# Patient Record
Sex: MALE | URBAN - METROPOLITAN AREA
[De-identification: names, ages, dates, MRNs, and addresses within clinical notes are randomized per-mention and may not be internally consistent; named-entity substitution may affect disease eponyms.]

---

## 2018-03-29 ENCOUNTER — OFFICE VISIT - RIVER FALLS (OUTPATIENT)
Dept: FAMILY MEDICINE | Facility: CLINIC | Age: 73
End: 2018-03-29

## 2018-03-29 ASSESSMENT — MIFFLIN-ST. JEOR: SCORE: 1591.31

## 2018-04-03 ENCOUNTER — OFFICE VISIT - RIVER FALLS (OUTPATIENT)
Dept: FAMILY MEDICINE | Facility: CLINIC | Age: 73
End: 2018-04-03

## 2018-04-04 LAB
CREAT SERPL-MCNC: 0.93 MG/DL (ref 0.7–1.18)
GLUCOSE BLD-MCNC: 90 MG/DL (ref 65–99)

## 2018-04-05 ENCOUNTER — OFFICE VISIT - RIVER FALLS (OUTPATIENT)
Dept: FAMILY MEDICINE | Facility: CLINIC | Age: 73
End: 2018-04-05

## 2018-04-17 ENCOUNTER — OFFICE VISIT - RIVER FALLS (OUTPATIENT)
Dept: FAMILY MEDICINE | Facility: CLINIC | Age: 73
End: 2018-04-17

## 2018-04-17 ENCOUNTER — COMMUNICATION - RIVER FALLS (OUTPATIENT)
Dept: FAMILY MEDICINE | Facility: CLINIC | Age: 73
End: 2018-04-17

## 2018-04-17 LAB
CREAT SERPL-MCNC: 0.98 MG/DL (ref 0.72–1.25)
GLUCOSE BLD-MCNC: 153 MG/DL (ref 65–100)

## 2018-05-09 ENCOUNTER — OFFICE VISIT - RIVER FALLS (OUTPATIENT)
Dept: FAMILY MEDICINE | Facility: CLINIC | Age: 73
End: 2018-05-09

## 2018-07-05 ENCOUNTER — OFFICE VISIT - RIVER FALLS (OUTPATIENT)
Dept: FAMILY MEDICINE | Facility: CLINIC | Age: 73
End: 2018-07-05

## 2020-08-21 ENCOUNTER — OFFICE VISIT - RIVER FALLS (OUTPATIENT)
Dept: FAMILY MEDICINE | Facility: CLINIC | Age: 75
End: 2020-08-21

## 2021-01-01 ENCOUNTER — AMBULATORY - RIVER FALLS (OUTPATIENT)
Dept: FAMILY MEDICINE | Facility: CLINIC | Age: 76
End: 2021-01-01

## 2021-03-31 ENCOUNTER — AMBULATORY - RIVER FALLS (OUTPATIENT)
Dept: FAMILY MEDICINE | Facility: CLINIC | Age: 76
End: 2021-03-31

## 2022-01-01 ENCOUNTER — COMMUNICATION - RIVER FALLS (OUTPATIENT)
Dept: FAMILY MEDICINE | Facility: CLINIC | Age: 77
End: 2022-01-01

## 2022-01-01 VITALS
WEIGHT: 189.8 LBS | WEIGHT: 176.4 LBS | BODY MASS INDEX: 26.58 KG/M2 | OXYGEN SATURATION: 97 % | SYSTOLIC BLOOD PRESSURE: 160 MMHG | SYSTOLIC BLOOD PRESSURE: 120 MMHG | SYSTOLIC BLOOD PRESSURE: 140 MMHG | SYSTOLIC BLOOD PRESSURE: 122 MMHG | DIASTOLIC BLOOD PRESSURE: 78 MMHG | DIASTOLIC BLOOD PRESSURE: 82 MMHG | HEART RATE: 44 BPM | HEART RATE: 64 BPM | TEMPERATURE: 97.4 F | TEMPERATURE: 97.6 F | DIASTOLIC BLOOD PRESSURE: 68 MMHG | TEMPERATURE: 98.2 F | DIASTOLIC BLOOD PRESSURE: 70 MMHG | BODY MASS INDEX: 26.05 KG/M2 | DIASTOLIC BLOOD PRESSURE: 78 MMHG | BODY MASS INDEX: 28.11 KG/M2 | TEMPERATURE: 97.7 F | HEIGHT: 69 IN | HEART RATE: 80 BPM | SYSTOLIC BLOOD PRESSURE: 142 MMHG | WEIGHT: 188.8 LBS | TEMPERATURE: 97 F | BODY MASS INDEX: 27.88 KG/M2 | HEART RATE: 60 BPM | WEIGHT: 180 LBS | HEART RATE: 72 BPM

## 2022-01-01 VITALS
TEMPERATURE: 98.1 F | WEIGHT: 180.6 LBS | HEART RATE: 83 BPM | SYSTOLIC BLOOD PRESSURE: 130 MMHG | OXYGEN SATURATION: 98 % | DIASTOLIC BLOOD PRESSURE: 82 MMHG

## 2022-01-01 VITALS
TEMPERATURE: 97.6 F | SYSTOLIC BLOOD PRESSURE: 122 MMHG | BODY MASS INDEX: 24.13 KG/M2 | HEART RATE: 80 BPM | DIASTOLIC BLOOD PRESSURE: 64 MMHG | WEIGHT: 163.4 LBS

## 2022-02-16 NOTE — PROGRESS NOTES
Chief Complaint    Follow up mood and BMP results. Worsening depression and decreased appetite. According to  and friend, patient is having suicidal thoughts.  History of Present Illness      pt here today with 2 family friends and wife, pt has been taking lexapro and quetiapine but continues to have suicidal ideation.  he has been a poor historian due to his medical condition, but today his friend notes that pt's depressed mood was of sudden onset about 3-4 months ago.  it has been resistant to treatment in an outpt setting, and he is willing to seek in patient care, although would prefer to not need it.  His wife reports he paces all night, he seems very anxious.  His PHQ9 = 25/27.  Physical Exam   Vitals & Measurements    T: 98.2   F (Tympanic)  HR: 44(Peripheral)  BP: 160/70  SpO2: 97%     HT: 69 in  WT: 180 lb       Review of systems is negative except as per HPI      Exam:      General: alert and oriented ×3 no acute distress.      HEENT: pupils are equal round and reactive to light extraocular motion is intact. Normocephalic and atraumatic.       Hearing is grossly normal and there is no otorrhea.       Nares are patent there is no rhinorrhea.       Mucous membranes are moist and pink.      Chest: has bilateral rise with no increased work of breathing.      Cardiovascular: normal perfusion and brisk capillary refill.      Musculoskeletal: no gross focal abnormalities and normal gait.      Neuro: no gross focal abnormalities and memory seems intact.      Psychiatric: speech is clear and coherent , memory seems impaired, pt not wearing a shirt and looks disheveled, affect is flat,         Assessment/Plan       Adjustment disorder (F43.0)         Orders:          16902 office outpatient visit 15 minutes (Charge), Quantity: 1, Adjustment disorder  Suicidal ideation          21783 office outpatient visit 15 minutes (Charge), Quantity: 1, Adjustment disorder          Return to Clinic (Request), RFV: follow  up mood, Return in 3 weeks          Return to Clinic (Request), RFV: lab for stat BMP and then appt with me to follow, thanks, Return in 1 week          Return to Clinic (Request), RFV: needs stat BMP for hyponatremia then appt with Tootie to follow, thanks, Return in 1 week                Suicidal ideation (R45.851)         Orders:          67158 office outpatient visit 15 minutes (Charge), Quantity: 1, Adjustment disorder  Suicidal ideation               15 minutes spent with patient in direct face to face contact, > 50% of time spent counseling and coordinating care.   spoke with [facility] ER physician for warm hand off,  pt will be arriving by POV with his wife and 2 family friends  Patient Information     Name:DOUG SANCHEZ      Address:      [address] [address]     Sex:Male     YOB: 1945     [phone]     Emergency Contact:KIAH SANCHEZ     MRN:[id number]     FIN:[id number]     Location:[facility]     Date of Service:05/09/2018      Primary Care Physician:       NONE ,       Attending Physician:       Alize Sommers MDica, [phone]  Problem List/Past Medical History    Ongoing     Actinic keratosis     Adjustment disorder     Anticoagulated     Benign prostatic hyperplasia       Comments: Elevated PSA     Changing skin lesion       Comments: Left upper cheek.     Facial basal cell cancer     Hx of adenomatous colonic polyps     Hydrocele     Hypertension     Low back pain     Neck pain     Osteoarthritis       Comments: Bilateral knees     Ptosis of both eyelids     Sacroiliac pain    Historical     No qualifying data  Procedure/Surgical History     Colonoscopy (01/13/2016)             Comments: Two tiny rectosigmoid polyps removed. Normal. &nbsp;Repeat in 5 years' time.     Replacement of left knee joint (01/21/2014)           Replacement of right knee joint (2010)           Hernia (1997)             Comments: Right  side     Excision of papilloma (11/07/1991)             Comments: Left hypopharyngeal wall.     Hernia (1962)             Comments: Left side     Shoulder             Comments: right     Varicocele             Comments: Right testicle. &nbsp;1980s.  Medications     hydrochlorothiazide-lisinopril 12.5 mg-10 mg oral tablet: 1 tab(s), PO, Daily, 30 tab(s), 0 Refill(s).     SEROquel 25 mg oral tablet: 25 mg, 1 tab(s), po, qhs, 30 tab(s), 1 Refill(s).     sodium chloride 1 g oral tablet: See Instructions, 1/2 tablet po qday, 30 EA, 1 Refill(s).     Lexapro 20 mg oral tablet: See Instructions, 1/2 tab(s) PO Daily, 15 EA, 1 Refill(s).          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 Exercise type: Bicycling, Walking, Weight lifting., 04/05/2018     Home and Environment      Marital status: ., 04/05/2018     Nutrition and Health      Type of diet: Regular., 04/05/2018     Sexual      Sexually active: Yes. Sexual orientation: Straight or heterosexual., 04/05/2018     Substance Abuse      Never, 04/05/2018     Tobacco      Never (less than 100 in lifetime), 04/05/2018  Family History    Patient was adopted    Son: History is negative  Immunizations      Vaccine Date Status      influenza virus vaccine, inactivated 11/06/2017 Recorded      influenza virus vaccine, inactivated 10/25/2016 Recorded      influenza virus vaccine, inactivated 10/14/2015 Recorded      influenza virus vaccine, inactivated 10/03/2014 Recorded      tetanus/diphth/pertuss (Tdap) adult/adol 01/22/2010 Recorded      influenza virus vaccine, inactivated 11/05/2008 Recorded      Td 02/02/1999 Recorded  Lab Results       Lab Results (Last 4 results within 90 days)        Sodium Level:  135 [135 mmol/L - 145 mmol/L] (04/17/18 15:23:00 CDT)       Sodium Level: 127 mmol/L Low [135 mmol/L - 146 mmol/L] (04/03/18 14:34:00 CDT)       Potassium Level: 3.9 [3.5 mmol/L - 5 mmol/L] (04/17/18 15:23:00 CDT)       Potassium Level: 4.1 mmol/L [3.5 mmol/L - 5.3 mmol/L] (04/03/18 14:34:00 CDT)       Chloride Level: 98 [98 mmol/L - 110 mmol/L] (04/17/18 15:23:00 CDT)       Chloride Level: 91 mmol/L Low [98 mmol/L - 110 mmol/L] (04/03/18 14:34:00 CDT)       CO2 Level: 26 [21 mmol/L - 31 mmol/L] (04/17/18 15:23:00 CDT)       CO2 Level: 29 mmol/L [20 mmol/L - 31 mmol/L] (04/03/18 14:34:00 CDT)       AGAP: 11 [5  - 18] (04/17/18 15:23:00 CDT)       Glucose Level: 153 High [65 mg/dL - 100 mg/dL] (04/17/18 15:23:00 CDT)       Glucose Level: 90 mg/dL [65 mg/dL - 99 mg/dL] (04/03/18 14:34:00 CDT)       BUN: 21 [8 mg/dL - 25 mg/dL] (04/17/18 15:23:00 CDT)       BUN: 14 mg/dL [7 mg/dL - 25 mg/dL] (04/03/18 14:34:00 CDT)       Creatinine Level: 0.98 [0.72 mg/dL - 1.25 mg/dL] (04/17/18 15:23:00 CDT)       Creatinine Level: 0.93 mg/dL [0.7 mg/dL - 1.18 mg/dL] (04/03/18 14:34:00 CDT)       BUN/Creat Ratio: 21 High [10  - 20] (04/17/18 15:23:00 CDT)       BUN/Creat Ratio: NOT APPLICABLE [6  - 22] (04/03/18 14:34:00 CDT)       eGFR: 83 mL/min/1.73m2 [8.6 mg/dL - 10.3 mg/dL] (04/03/18 14:34:00 CDT)       eGFR : >60 [6  - 22] (04/17/18 15:23:00 CDT)       eGFR African American: 96 mL/min/1.73m2 [6  - 22] (04/03/18 14:34:00 CDT)       eGFR Non-: >60 [6  - 22] (04/17/18 15:23:00 CDT)       Calcium Level: 9.6 [8.5 mg/dL - 10.5 mg/dL] (04/17/18 15:23:00 CDT)       Calcium Level: 9.8 mg/dL [8.6 mg/dL - 10.3 mg/dL] (04/03/18 14:34:00 CDT)       T4 Free: 1.3 ng/dL [0.8 ng/dL - 1.8 ng/dL] (03/29/18 12:25:00 CDT)       TSH: 1.6 mIU/L [0.4 mIU/L - 4.5 mIU/L] (03/29/18 12:25:00 CDT)

## 2022-02-16 NOTE — TELEPHONE ENCOUNTER
Message left for patient on 8-24-20 and no return call.  Tried to leave another message, but machine was not working correctly.

## 2022-02-16 NOTE — NURSING NOTE
Comprehensive Intake Entered On:  8/21/2020 1:06 PM CDT    Performed On:  8/21/2020 1:03 PM CDT by Rere Jenkins CMA   Chief Complaint :   f/u hospital stay 7/28-8/1.    Weight Measured :   180.6 lb(Converted to: 180 lb 10 oz, 81.92 kg)    Systolic Blood Pressure :   130 mmHg   Diastolic Blood Pressure :   82 mmHg (HI)    Mean Arterial Pressure :   98 mmHg   Peripheral Pulse Rate :   83 bpm   BP Site :   Right arm   BP Method :   Manual   HR Method :   Electronic   Temperature Tympanic :   98.1 DegF(Converted to: 36.7 DegC)    Oxygen Saturation :   98 %   Rere Jenkins CMA - 8/21/2020 1:03 PM CDT   Health Status   Allergies Verified? :   Yes   Medication History Verified? :   Yes   Pre-Visit Planning Status :   N/A   Tobacco Use? :   Never smoker   Rere Jenkins CMA - 8/21/2020 1:03 PM CDT   Consents   Consent for Immunization Exchange :   Consent Granted   Consent for Immunizations to Providers :   Consent Granted   Rere Jenkins CMA - 8/21/2020 1:03 PM CDT   Meds / Allergies   (As Of: 8/21/2020 1:06:56 PM CDT)   Allergies (Active)   No Known Medication Allergies  Estimated Onset Date:   Unspecified ; Created By:   Alesha Morfin CMA; Reaction Status:   Active ; Category:   Drug ; Substance:   No Known Medication Allergies ; Type:   Allergy ; Updated By:   Alesha Morfin CMA; Reviewed Date:   7/5/2018 12:56 PM CDT        Medication List   (As Of: 8/21/2020 1:06:56 PM CDT)   Prescription/Discharge Order    amLODIPine  :   amLODIPine ; Status:   Completed ; Ordered As Mnemonic:   amLODIPine 10 mg oral tablet ; Simple Display Line:   10 mg, 1 tab(s), PO, Daily, 90 tab(s), 3 Refill(s) ; Ordering Provider:   Asha Sommers MD; Catalog Code:   amLODIPine ; Order Dt/Tm:   7/5/2018 1:47:07 PM CDT            Home Meds    apixaban  :   apixaban ; Status:   Documented ; Ordered As Mnemonic:   apixaban 5 mg oral tablet ; Simple Display Line:   5 mg, Oral, bid, 60 tab(s), 0 Refill(s) ; Catalog Code:    apixaban ; Order Dt/Tm:   8/21/2020 1:05:20 PM CDT          levETIRAcetam  :   levETIRAcetam ; Status:   Documented ; Ordered As Mnemonic:   levETIRAcetam 500 mg oral tablet ; Simple Display Line:   500 mg, 1 tab(s), Oral, bid, 0 Refill(s) ; Catalog Code:   levETIRAcetam ; Order Dt/Tm:   8/21/2020 1:05:42 PM CDT          mirtazapine  :   mirtazapine ; Status:   Documented ; Ordered As Mnemonic:   mirtazapine 15 mg oral tablet ; Simple Display Line:   22.5 mg, 1.5 tab(s), PO, Once a day (at bedtime), 30 tab(s), 0 Refill(s) ; Catalog Code:   mirtazapine ; Order Dt/Tm:   7/5/2018 12:57:44 PM CDT          senna  :   senna ; Status:   Documented ; Ordered As Mnemonic:   senna 8.6 mg oral tablet ; Simple Display Line:   25.8 mg, 3 tab(s), PO, bid, PRN: for constipation, 20 tab(s), 0 Refill(s) ; Catalog Code:   senna ; Order Dt/Tm:   7/5/2018 12:58:04 PM CDT          venlafaxine  :   venlafaxine ; Status:   Documented ; Ordered As Mnemonic:   venlafaxine 75 mg oral capsule, extended release ; Simple Display Line:   225 mg, 3 cap(s), PO, Daily, 30 cap(s), 0 Refill(s) ; Catalog Code:   venlafaxine ; Order Dt/Tm:   7/5/2018 12:58:32 PM CDT            ID Risk Screen   Recent Travel History :   No recent travel   Family Member Travel History :   No recent travel   Other Exposure to Infectious Disease :   Unknown   Rere Jenkins CMA - 8/21/2020 1:03 PM CDT

## 2022-02-16 NOTE — TELEPHONE ENCOUNTER
---------------------  From: Sheeba Olga HARKINS   Sent: 2/18/2021 5:04:42 PM CST  Subject: General Message-Keppra med/refill     Phone Message:      PCP: BALAJI    Person Calling: Son(no name left)  Phone: [phone]  Time: 4:18pm    Reason for call: Son called and left a message stating that pt has seen Dr. Sommers and just noticed that pt is totally out of his Keppra medication and needs it filled. I called to get more information and wife answered stating that pt does not leave their house much anymore and son got worried when he noticed medication was gone and wife states pt doesn't like taking his medications but she is not sure what provider at [facility] fills the Keppra medication. Informed pt that since Dr. Sommers hasn't seen him since 8/2020 and never has filled his Keppra or any meds and not in clinic today, that either looking at the pill bottle to get the provider information or contact [facility] pharmacy and have them assist her with contacting prescribing provider for refill. Wife agrees and will call us back if she needs anything.             Note:    Transferred to:

## 2022-02-16 NOTE — PROGRESS NOTES
Chief Complaint    Patient is here for major depression and anxiety. Also c/o vertebrae in neck and related pain. Has not thyroid checked in 3-4 years. Has also had some high er blood pressure in the mornings.  History of Present Illness      Patient is here today to establish care with me.  He reports that he has been having a depressed mood for about 3-3-1/2 months.  He has lost 2 children in the past 6 months, one due to illness and the other 2 an accident.  He has one living child.  His PHQ 9 is equal to 12 and his RAS 7 is equal to 15.  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  His primary care clinic has been with the [facility] however he would like to switch to [facility] because he is only a couple of miles away from our clinic and her  which still measures about 45 minutes away for him.       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  He is followed by [facility] [place] .  He  is in to see them approximately every 6 months.       He also has had intermittent neck pain for approximately 5 years.  He reports that the chiropractor told him that it was kind of mushy at 1 of his discs.  The last time it flared was about a week ago.  He is interested in getting an x-ray done on this.       Past surgical history report of knee replacement ×2, shoulder surgery ×1.       Social history patient is , he is a retired clinical psychologist.  He is one dislocation away from having his PhD.  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  He does not drink smoke or do any illicit drugs of abuse.  He does exercise regularly, usually lifting weights approximately times per week and walks or rides a bike 4-5 times per week.       Family history patient is adopted so he does not know his personal family history but does know  that he was Sarah.       Review of systems is positive for some weight loss.  At first he thinks that this was intentional but now thinks that there may also be an element of a mood disorder associated with this.  He also has some vision changes.  He reports that he has a right eye cataract that is followed by Dr. Ortiz.       Exam       General patient is alert and oriented ×3 in no acute distress.  He is normocephalic and atraumatic his pupils are equally round and reactive to light and his extraocular motion is intact his conjunctiva is not injected his hearing is grossly normal.  His chest has bilateral rise with no increased work of breathing.  Cardiovascular normal tissue perfusion and brisk capillary refill.  Musculoskeletal walks independently.  Neuro cranial nerves II through XII are grossly intact and he has a resting hand tremor.  Psych patient makes good eye contact.  He has good insight.  His memory is intact.  His thoughts are intact.  He has no suicidal or homicidal ideation.  His mood is both sad and anxious.  His affect is sad.  He has some slightly pressured speech as well as some psychomotor agitation.       Skin patient has numerous actinic keratoses on his back chest face.  He has a well-healing incision on his left shoulder.  No evidence of infection.       Assessment and plan       #1 adjustment disorder most likely related to the death of 2 of his 3 children within the past 6 months and then also some personal problems that his son is dealing with.  Encouraged patient to check with his insurance to find a psychologist that he would feel comfortable talking with that would be in network.  Patient would also like to have his thyroid checked which I am in agreement with.  Orders for this was placed today.  Patient also would like to start with medications.  When he was in practice he found that several of the doctors that he had worked with use the combination of fluoxetine 40 mg daily and  clonazepam 1 mg as needed.  We discussed that fluoxetine can sometimes cause some initial worsening of anxiety symptoms so felt that Lexapro might be a better initial choice for SSRI.  We discussed that for most adults he can start with 10 mg daily and that this is usually at therapeutic dose and that frequently people are feeling improved within a week.  In regards to the clonazepam I am concerned about risk of sedation and falls.  Would prefer to wait on the thyroid testing to see how patient does with the Lexapro.  Will have patient return to clinic in 1-2 weeks.  For cost reasons we did decide to go with the Lexapro 20 mg one half tablet p.o. daily       #2 patient with numerous actinic keratoses and history of numerous skin cancers.  Patient continues to follow-up with [facility] in [facility].       45 minutes was spent with patient today in direct face-to-face contact of which greater than 50% of the time spent counseling patient.  Physical Exam   Vitals & Measurements    T: 97.4(Tympanic)  HR: 64(Peripheral)  BP: 142/82     HT: 69.00 in  WT: 189.8 lb  BMI: 28.03   Assessment/Plan       Depressed         Ordered:          escitalopram, See Instructions, Instructions: 1/2 tab(s) PO Daily, # 30 EA, 1 Refill(s), Type: Hard Stop          escitalopram, See Instructions, Instructions: 1/2 tab(s) PO Daily, # 30 EA, 1 Refill(s), Type: Maintenance, Pharmacy: [id number], 1/2 tab(s) PO Daily          T4, free* ([facility]), Specimen Type: Serum, Collection Date: 03/29/18 11:52:00 CDT          TSH* ([facility]), Specimen Type: Serum, Collection Date: 03/29/18 11:52:00 CDT                Neck pain         Ordered:          XR Spine Cervical Comp w/ Obliques (Request), Neck pain                Orders:         Return to Clinic (Request), Return in 1 week  Patient Information     Name:DOUG SANCHEZ      Address:      [address] [address]     Sex:Male     YOB: 1945      [phone]     Emergency Contact:KIAH SANCHEZ     MRN:[id number]     FIN:[id number]     Location:[facility]     Date of Service:03/29/2018      Primary Care Physician:       NONE ,   Problem List/Past Medical History    Ongoing     Anticoagulated     Knee Replacement    Historical  Medications        hydrochlorothiazide-lisinopril 12.5 mg-10 mg oral tablet: 1 tab(s), PO, Daily, 30 tab(s), 0 Refill(s).        cyclobenzaprine 10 mg oral tablet: 10 mg, 1 tab(s), PO, TID, PRN: for spasm, 30 tab(s), 0 Refill(s).        Lexapro 20 mg oral tablet: See Instructions, 1/2 tab(s) PO Daily, 30 EA, 1 Refill(s).                Allergies    No known allergies  Social History    Smoking Status - 03/29/2018     Never smoker  Immunizations      Vaccine Date Status      influenza virus vaccine, inactivated 11/05/2008 Recorded  Lab Results      Results (Last 90 days)      No results located.

## 2022-02-16 NOTE — LETTER
(Inserted Image. Unable to display)       November 20, 2019        DOUG SANCHEZ   [address], [place] [id number]      Dear DOUG,      Thank you for selecting [facility] for your healthcare needs.      Hi Doug,    I have not seen you in  awhile.  Please come in for a follow up appointment.  I'm worried about you and your depression.          Please contact my practice at [phone] if you have any questions or concerns.     Sincerely,        Asha Sommers MD

## 2022-02-16 NOTE — PROGRESS NOTES
Chief Complaint    Patient is here for anxiety and depression follow up. Concerned about serotonin syndrome.  History of Present Illness      Patient presents to clinic today for follow-up of his adjustment disorder.  At our last visit he was started on Lexapro 10 mg daily.  He reports that he thinks that he is feeling more anxious.  However, he thinks that this is most likely situational.  He living son is having some personal problems.  Patient's concern regarding his son is affecting his mood.  It is also causing him to remember the feelings regarding the death of his 2 other children.  His relationship with his wife is always been good however the stress due to the concern regarding her living son is creating some problems with her relationship.  Not because they are finding but rather because they are both so concerned about him.  He seems to be ruminating over thoughts of behaviors that she is regretful for.  He also said that he could not remember if I had recommended checking both the sodium and potassium level.  He thinks that despite his feeling that he might be a little more anxious than before she would like to continue with the Lexapro for an entire month.  He would, however, like to have a as needed benzyl available.  He is particularly familiar with use of Klonopin due to his previous clinical experiences.  I explained that due to his age I would prefer to try something that was shorter acting.  I would like us to be able to get his mood treated well enough that he did not feel he needed to use the anxiety medicine very frequently.  I would not want him to drive while taking it.  We also discussed counseling again.  He reports that he had not looked into finding counselor because he feels that he has a strong support system of the past years and other mental health professionals that he had worked with better now spasms.  However, when he is with them they will ask him how he is doing things himself  being exhausted by having to reassure them that he is doing okay.  We discussed that he see him that he might find it to be more beneficial to actually see a professional that he does not have to feel she needs to reassure her.  Encouraged him again to consider referral to counseling.      He would like to try some ativan, said he thought that he might use is 3-4 days per week  My goal for him would be to use this only while waiting for the lexapro to become effective.  He is aware that it can interact with his cyclobenzaprine so he should stop taking the cyclobenzaprine.      also discussedd neck imaging, he gets neck pain exacerbations about 4 times per year, xr shows some degenerative changes.  recommended he let me know the next time he has an exacerbation so we can try some oral prednisone for him.  also discussed foraminal stenosis      ros as per HPI otherwise neg      examgen a/ox3nad      heent perrl eomi      chest bilateral tise no increased wob      cv nml perfusion and brisk cap refill      neuro CN 2-12 GI, mild memory impairment      psych less disheveled than last week, seems less sad, some psychomotor agitation, no SI or HI, pressured speech      a/p      adjustment disorder cont with the lexapro, will add some prn ativan but pt told to dc his cyclobenzaprin and not drive with the ativan, consider again finding a counselor      chronic intermittant neck pain suggested pt rtc prn and may try steroid burst with next exacerbation      25 minutes spent with pt > 50 % spent counseling.  Physical Exam   Vitals & Measurements    T: 97.0(Tympanic)  HR: 80(Peripheral)  BP: 122/78     WT: 188.8 lb   Assessment/Plan       Adjustment disorder         Ordered:          LORazepam, 1 tab(s) ( 0.5 mg ), po, daily, PRN: as needed for anxiety, # 20 tab(s), 0 Refill(s), Type: Maintenance, Pharmacy: [id number], 1 tab(s) po daily,PRN:as needed for anxiety          Basic Metabolic Panel* ([facility]), Specimen  Type: Serum, Collection Date: 04/03/18 14:22:00 CDT           Patient Information     Name:DOUG SANCHEZ      Address:      [address] [address]     Sex:Male     YOB: 1945     [phone]     Emergency Contact:KIAH SANCHEZ     MRN:[id number]     FIN:[id number]     Location:[facility]     Date of Service:04/03/2018      Primary Care Physician:       NONE ,   Problem List/Past Medical History    Ongoing     Anticoagulated     Knee Replacement    Historical  Medications        hydrochlorothiazide-lisinopril 12.5 mg-10 mg oral tablet: 1 tab(s), PO, Daily, 30 tab(s), 0 Refill(s).        cyclobenzaprine 10 mg oral tablet: 10 mg, 1 tab(s), PO, TID, PRN: for spasm, 30 tab(s), 0 Refill(s).        Lexapro 20 mg oral tablet: See Instructions, 1/2 tab(s) PO Daily, 30 EA, 1 Refill(s).        LORazepam 0.5 mg oral tablet: 0.5 mg, 1 tab(s), po, daily, PRN: as needed for anxiety, 20 tab(s), 0 Refill(s).  Allergies    No known allergies  Social History    Smoking Status - 04/03/2018     Never smoker  Immunizations      Vaccine Date Status      influenza virus vaccine, inactivated 11/06/2017 Recorded      influenza virus vaccine, inactivated 10/25/2016 Recorded      influenza virus vaccine, inactivated 10/14/2015 Recorded      influenza virus vaccine, inactivated 10/03/2014 Recorded      tetanus/diphth/pertuss (Tdap) adult/adol 01/22/2010 Recorded      influenza virus vaccine, inactivated 11/05/2008 Recorded  Lab Results      Results (Last 90 days)                Laboratory                     Chemistry                          General Chemistry                               BUN:      14 mg/dL  (04/03/18 02:34 PM CDT)                                                                                                                                          BUN/Creat Ratio:      NOT APPLICABLE   (04/03/18 02:34 PM CDT)                                                                                                                                           Basic Metabolic Profile:         (04/03/18 02:34 PM CDT)                                                                                                                                          CO2 Level:      29 mmol/L  (04/03/18 02:34 PM CDT)                                                                                                                                          Calcium Level:      9.8 mg/dL  (04/03/18 02:34 PM CDT)                                                                                                                                          Chloride Level:      L 91 mmol/L (Range 98 - 110)  (04/03/18 02:34 PM CDT)                                                                                                                                          Creatinine Level:      0.93 mg/dL  (04/03/18 02:34 PM CDT)                                                                                                                                          Glucose Level:      90 mg/dL  (04/03/18 02:34 PM CDT)                                                                                                                                          Potassium Level:      4.1 mmol/L  (04/03/18 02:34 PM CDT)                                                                                                                                          Sodium Level:      L 127 mmol/L (Range 135 - 146)  (04/03/18 02:34 PM CDT)                                                                                                                                          eGFR:      83 mL/min/1.73m2  (04/03/18 02:34 PM CDT)                                                                                                                                          eGFR :      96 mL/min/1.73m2  (04/03/18 02:34 PM CDT)                                                                                                                                      Thyroid Studies                               T4 Free                                        (03/29/18 12:25 PM CDT)                                                                                                                                                1.3 ng/dL  (03/29/18 12:25 PM CDT)                                                                                                                                          TSH                                        (03/29/18 12:25 PM CDT)                                                                                                                                                1.60 mIU/L  (03/29/18 12:25 PM CDT)

## 2022-02-16 NOTE — LETTER
(Inserted Image. Unable to display)     September 21, 2020      DOUG SANCHEZ   [address] [place], [place] [id number]          Dear DOUG,      Thank you for selecting [facility] (previously [facility]) for your healthcare needs.      Our records indicate you are due for the following services:     Follow-up office visit.      To schedule an appointment or if you have further questions, please contact your primary clinic:   [facility]       [phone]   [facility]       [phone]              [facility]     [phone]      Powered by [employer]    Sincerely,    Asha Sommers MD

## 2022-02-16 NOTE — PROGRESS NOTES
Chief Complaint    Patient is here for follow up from [facility].  History of Present Illness      patient present to clinic today for follow up from the hospital.        He was hospitalized for his major depressive disorder with psychotic features that was resistant to numerous outpatient medications.  While hospitalized he was also noted to have a heart block and underwent pacemaker placement.  He has outpatient follow-up scheduled with psychiatry next week.  He was discharged start with Effexor but reports that he has not been taking it.  Patient's discharge summary was reviewed and shows that patient had good response to electroconvulsive therapy.  He underwent a total of 7 treatments.  He reports he does not think that the Effexor is helpful for him.  However his wife reports that she thinks that it makes a difference for him.  Counseled patient that anytime the medication is tapered up we usually taper it back down and I would really encourage him to talk with his psychiatrist before abruptly stopping his antidepressant medications as he is certainly doing much better now than it was prior to his hospitalization.  He voiced understanding and agrees with this.       He also reports that his ears have been feeling clogged and has had some decreased hearing but no pain or fevers nausea vomiting diarrhea or constipation.  He also has no chest pain or shortness of breath or wheezes or cough.       He also has a skin lesion on his left forearm that he is worried about.  He has a history of skin cancer and is usually followed by dermatology.  He is not sure how long it has been there but says that it is not healing.  He is not sure if it has changed in size.       Review of systems is negative except as per HPI including:  no fevers, chills, sore throat, runny nose, nausea, vomiting, constipation, diarrhea, rash or new skin lesions, chest pain, palpitations, slurred speech, new paresthesia, shortness of  breath or wheeze.       Exam:       General: alert and oriented ×3 no acute distress.       HEENT: pupils are equal round and reactive to light extraocular motion is intact. Normocephalic and atraumatic.        Hearing is grossly mildly impaired.  And there is no otorrhea.  He does have bilateral cerumen impaction.  I was able to use an ear curette to remove some of this wax.  CSS then finished the cerumen disimpaction with irrigation.  Overall he tolerated the procedure quite well.       Nares are patent there is no rhinorrhea.        Mucous membranes are moist and pink.       Chest: has bilateral rise with no increased work of breathing.       Cardiovascular: normal perfusion and brisk capillary refill.       Musculoskeletal: no gross focal abnormalities and normal gait.       Neuro: no gross focal abnormalities and memory seems intact.       Psychiatric: speech is clear and coherent and fluent. Patient dressed appropriately for the weather. Mood is appropriate and affect is full.       Skin patient has numerous actinic keratosis and seborrheic keratosis the lesion that is most concerned about at this time is a raised oval scaly tannish reddish lesion on his left forearm that is approximately 6 mm x 20 mm.  There is no eschar present.                        Discussed with patient to return to clinic if symptoms worsen or do not improve  Physical Exam   Vitals & Measurements    T: 97.6   F (Tympanic)  HR: 80(Peripheral)  BP: 122/64     WT: 163.4 lb   Assessment/Plan   1.  Major depressive disorder now much improved encouraged patient to keep appointment with his psychiatrist to continue with his Effexor until he is evaluated there.    #2 status post cardiac pacemaker placement due to heart block.  Patient should follow-up with his cardiologist as scheduled.    #3 bilateral cerumen impaction now status post disimpaction.  Encouraged patient once a weeks he is a drop of mineral oil or olive oil to each ear at bedtime  and to rinse that out in the shower in the morning to help prevent further impaction.    #4 nonhealing lesion on his left forearm.  A history of numerous previous skin cancers.  Explained to patient that I would be happy to remove the lesion for him but given his history of significant skin cancer burden and actinic keratosis would recommend that he instead follow-up with his dermatologist in [facility] so that they can reexamine both this lesion as well as the rest of his skin exam.  He agreed to follow-up with them soon.  Patient Information     Name:DOUG SANCHEZ      Address:      [address] [address]     Sex:Male     YOB: 1945     [phone]     Emergency Contact:KIAH SANCHEZ     MRN:[id number]     FIN:[id number]     Location:[facility]     Date of Service:07/05/2018      Primary Care Physician:       KATHARINE       Attending Physician:       Tootie HANKS [facility], [phone]  Problem List/Past Medical History    Ongoing     Actinic keratosis     Adjustment disorder     Anticoagulated     Benign prostatic hyperplasia       Comments: Elevated PSA     Changing skin lesion       Comments: Left upper cheek.     Facial basal cell cancer     Hx of adenomatous colonic polyps     Hydrocele     Hypertension     Low back pain     Neck pain     Osteoarthritis       Comments: Bilateral knees     Ptosis of both eyelids     Sacroiliac pain    Historical     No qualifying data  Procedure/Surgical History     Colonoscopy (01/13/2016)            Comments:      Two tiny rectosigmoid polyps removed.      Normal.  Repeat in 5 years' time.     Replacement of left knee joint (01/21/2014)           Replacement of right knee joint (2010)           Hernia (1997)            Comments:      Right side     Excision of papilloma (11/07/1991)            Comments:      Left hypopharyngeal wall.     Hernia (1962)            Comments:      Left side     Dual Chamber  Permanent Pacemaker Implant            Comments:      (Pocket Location): Left Pectoral  (Model: W1DR01; Serial Number: [id number])      (Chamber): Right Ventricle (Location): Septum (Model: 5076-58 ; Serial Number: [id number])      (Chamber): Right Atrium (Location): Right Appendage (Model: 5076-52 ; Serial Number: [id number])      : [employer]     Shoulder            Comments:      right     Varicocele            Comments:      Right testicle.  1980s.  Medications     mirtazapine 15 mg oral tablet: 22.5 mg, 1.5 tab(s), PO, Once a day (at bedtime), 30 tab(s), 0 Refill(s).     senna 8.6 mg oral tablet: 25.8 mg, 3 tab(s), PO, bid, PRN: for constipation, 20 tab(s), 0 Refill(s).     amLODIPine 10 mg oral tablet: 10 mg, 1 tab(s), PO, Daily, 90 tab(s), 0 Refill(s).     venlafaxine 75 mg oral capsule, extended release: 225 mg, 3 cap(s), PO, Daily, 30 cap(s), 0 Refill(s).          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 Exercise type: Bicycling, Walking, Weight lifting., 04/05/2018     Home and Environment      Marital status: ., 04/05/2018     Nutrition and Health      Type of diet: Regular., 04/05/2018     Sexual      Sexually active: Yes. Sexual orientation: Straight or heterosexual., 04/05/2018     Substance Abuse      Never, 04/05/2018     Tobacco      Never (less than 100 in lifetime), 04/05/2018  Family History    Patient was adopted    Son: History is negative  Immunizations      Vaccine Date Status      influenza virus vaccine, inactivated 11/06/2017 Recorded      influenza virus vaccine, inactivated 10/25/2016 Recorded      influenza virus vaccine, inactivated 10/14/2015 Recorded      influenza virus vaccine, inactivated  10/03/2014 Recorded      tetanus/diphth/pertuss (Tdap) adult/adol 01/22/2010 Recorded      influenza virus vaccine, inactivated 11/05/2008 Recorded      Td 02/02/1999 Recorded  Lab Results       Lab Results (Last 4 results within 90 days)        Sodium Level: 135 [135 mmol/L - 145 mmol/L] (04/17/18 15:23:00 CDT)       Potassium Level: 3.9 [3.5 mmol/L - 5 mmol/L] (04/17/18 15:23:00 CDT)       Chloride Level: 98 [98 mmol/L - 110 mmol/L] (04/17/18 15:23:00 CDT)       CO2 Level: 26 [21 mmol/L - 31 mmol/L] (04/17/18 15:23:00 CDT)       AGAP: 11 [5  - 18] (04/17/18 15:23:00 CDT)       Glucose Level: 153 High [65 mg/dL - 100 mg/dL] (04/17/18 15:23:00 CDT)       BUN: 21 [8 mg/dL - 25 mg/dL] (04/17/18 15:23:00 CDT)       Creatinine Level: 0.98 [0.72 mg/dL - 1.25 mg/dL] (04/17/18 15:23:00 CDT)       BUN/Creat Ratio: 21 High [10  - 20] (04/17/18 15:23:00 CDT)       eGFR : >60 [10  - 20] (04/17/18 15:23:00 CDT)       eGFR Non-: >60 [10  - 20] (04/17/18 15:23:00 CDT)       Calcium Level: 9.6 [8.5 mg/dL - 10.5 mg/dL] (04/17/18 15:23:00 CDT)

## 2022-02-16 NOTE — LETTER
(Inserted Image. Unable to display)         September 08, 2020        DOUG DANIEL   [address] [place], [place] [id number]        Dear DOUG,    Thank you for selecting [facility] for your healthcare needs.    Our records indicate you are due for the following services:    Follow-up office visit - Chest X-Ray    To schedule an appointment, please contact the referral department at [facility] who will assist you in scheduling your service. They can be reached at either of these numbers [phone] or [phone]. If you have other questions or need additional information, please feel free to contact your primary care clinic:     To schedule an appointment or if you have further questions, please contact your primary clinic:   [facility]       [phone]   [facility]       [phone]              [facility]     [phone]      Powered by [employer]    Sincerely,    Asha Sommers MD

## 2022-02-16 NOTE — PROGRESS NOTES
Chief Complaint    Patient is here for anxiety and depression. Was in ER this morning. Is requesting different medication.  History of Present Illness      I received a phone call from Dr. Deal in the emergency room today.  Patient's wife brought patient to the emergency room because all of his depression.  Dr. Deal know that I was in clinic and would be happy to see patient.  Patient was in clinic earlier this week to see me.  He had been on Lexapro for 1 week at that time and had not really improved at all.  We checked his sodium level and found that it was down to 127.  He was called yesterday and told to discontinue his Lexapro due to the low sodium levels.  Today patient has brought his wife, Sandi, with him.  She says that while they were at home patient was pacing could not relax was feeling very sad she was worried about him and he agreed with her that he should go to the emergency room.  Patient continues to report that he does feel very sad and that he would be better off dead but he has no desire to commit suicide due to his strong Christian jerald.  We agreed that if one SSRI it caused hyponatremia and then we would try to avoid this class of medications and see if alternate might be useful for him.  We discussed considering Zyprexa or quetiapine versus Depakote.  Today patient agreed with trying to start 12-1/2 mg of quetiapine daily.       Review of systems is as per HPI and otherwise negative       Exam general patient is a little disheveled is alert and oriented in no acute distress.       Psych patient has a hard time answering questions I do not know.  He makes eye contact.  He reports that he does feel depressed and anxious.  Denies suicidal or homicidal ideations no hallucinations.  His affect is flat.  He is more disheveled today when he was earlier in the week.       Assessment and plan major depressive disorder.  Concern for possible problems with dementia also.  Will try to start Seroquel 25  mg one half pill p.o. nightly and have patient return to clinic in 1 week.  He should return to clinic sooner if needed or again go to the emergency room if needed.  25 minutes was spent with patient and his wife in direct face-to-face contact of which greater than 50% time spent counseling patient.  Physical Exam   Vitals & Measurements    T: 97.6(Tympanic)  HR: 60(Peripheral)  BP: 140/78   Assessment/Plan       Adjustment disorder         Ordered:          QUEtiapine, See Instructions, Instructions: 1/2 tab(s) po qhs, # 30 EA, 1 Refill(s), Type: Maintenance, Pharmacy: [facility] #[id number], please discontinue prescription for seroquel 25 mg tid, sent in error! thanks, 1/2 tab(s) po qhs          [id number] office outpatient visit 25 minutes (Charge), Quantity: 1, Adjustment disorder          Referral (Request), 04/05/18 11:28:00 CDT, Referred to: Other, Referred to: geriatric psychiatry, Adjustment disorder          Return to Clinic (Request), RFV: follow up mood, Return in 3 weeks          Return to Clinic (Request), RFV: needs stat BMP for hyponatremia then appt with [facility] to follow, thanks, Return in 1 week                Orders:         LORazepam, 1 tab(s) ( 0.5 mg ), po, daily, PRN: as needed for anxiety, # 20 tab(s), 0 Refill(s), Type: Maintenance, Pharmacy: [facility] #[id number], 1 tab(s) po daily,PRN:as needed for anxiety         Basic Metabolic Panel* ([facility]), Specimen Type: Serum, Collection Date: 04/03/18 14:22:00 CDT  Patient Information     Name:DOUG SANCHEZ      Address:      [address] [address]     Sex:Male     YOB: 1945     [phone]     Emergency Contact:KIAH SANCHEZ     MRN:[id number]     FIN:[id number]     Location:[facility]     Date of Service:04/05/2018      Primary Care Physician:       NONE ,   Problem List/Past Medical History    Ongoing     Adjustment disorder     Anticoagulated     History of knee replacement      Neck pain    Historical  Procedure/Surgical History     Knee replacement     Shoulder  Medications        hydrochlorothiazide-lisinopril 12.5 mg-10 mg oral tablet: 1 tab(s), PO, Daily, 30 tab(s), 0 Refill(s).        LORazepam 0.5 mg oral tablet: 0.5 mg, 1 tab(s), po, daily, PRN: as needed for anxiety, 20 tab(s), 0 Refill(s).        SEROquel 25 mg oral tablet: See Instructions, 1/2 tab(s) po qhs, 30 EA, 1 Refill(s).                Allergies    No known allergies  Social History    Smoking Status - 04/05/2018     Never smoker     Alcohol - 04/05/2018      Never     Employment and Education - 04/05/2018      Retired, Work/School description: Psychologist.     Exercise and Physical Activity - 04/05/2018      Exercise frequency: 3-4 times/week. Exercise type: Bicycling, Walking, Weight lifting.     Home and Environment - 04/05/2018      Marital status: .     Nutrition and Health - 04/05/2018      Type of diet: Regular.     Sexual - 04/05/2018      Sexually active: Yes. Sexual orientation: Straight or heterosexual.     Substance Abuse - 04/05/2018      Never     Tobacco - 04/05/2018      Never (less than 100 in lifetime)  Immunizations      Vaccine Date Status      influenza virus vaccine, inactivated 11/06/2017 Recorded      influenza virus vaccine, inactivated 10/25/2016 Recorded      influenza virus vaccine, inactivated 10/14/2015 Recorded      influenza virus vaccine, inactivated 10/03/2014 Recorded      tetanus/diphth/pertuss (Tdap) adult/adol 01/22/2010 Recorded      influenza virus vaccine, inactivated 11/05/2008 Recorded  Lab Results      Results (Last 90 days)                Laboratory                     Chemistry                          General Chemistry                               BUN:      14 mg/dL  (04/03/18 02:34 PM CDT)                                                                                                                                          BUN/Creat Ratio:      NOT APPLICABLE    (04/03/18 02:34 PM CDT)                                                                                                                                          Basic Metabolic Profile:         (04/03/18 02:34 PM CDT)                                                                                                                                          CO2 Level:      29 mmol/L  (04/03/18 02:34 PM CDT)                                                                                                                                          Calcium Level:      9.8 mg/dL  (04/03/18 02:34 PM CDT)                                                                                                                                          Chloride Level:      L 91 mmol/L (Range 98 - 110)  (04/03/18 02:34 PM CDT)                                                                                                                                          Creatinine Level:      0.93 mg/dL  (04/03/18 02:34 PM CDT)                                                                                                                                          Glucose Level:      90 mg/dL  (04/03/18 02:34 PM CDT)                                                                                                                                          Potassium Level:      4.1 mmol/L  (04/03/18 02:34 PM CDT)                                                                                                                                          Sodium Level:      L 127 mmol/L (Range 135 - 146)  (04/03/18 02:34 PM CDT)                                                                                                                                          eGFR:      83 mL/min/1.73m2  (04/03/18 02:34 PM CDT)                                                                                                                                          eGFR   American:      96 mL/min/1.73m2  (04/03/18 02:34 PM CDT)                                                                                                                                     Thyroid Studies                               T4 Free                                        (03/29/18 12:25 PM CDT)                                                                                                                                                1.3 ng/dL  (03/29/18 12:25 PM CDT)                                                                                                                                          TSH                                        (03/29/18 12:25 PM CDT)                                                                                                                                                1.60 mIU/L  (03/29/18 12:25 PM CDT)

## 2022-02-16 NOTE — CARE COORDINATION
[facility] room nurse called and explained that patient was in the ED and they were unable to see the notes from recent visit and labs with Dr. Sommers.  For continuity of care copy of visit and labs were hand carried to ED.

## 2022-02-16 NOTE — PROGRESS NOTES
Chief Complaint    Patient is here for anxiety follow up. Concerned with continuing depression.  History of Present Illness      phq9=18, patient presents to clinic today with his .  He continues to be very depressed.  His repeat BMP shows that with the cessation of his Lexapro his hyponatremia has resolved.  He reports that he has been taking a whole Seroquel at night instead of just half of the Seroquel.  He is now able to sleep about 9 hours per night.  Unfortunately, his mood is not improved.  At our last visit he and his wife had planned to try to get patient an appointment with his wife's psychiatrist.  Patient reports that they did not really follow through with this.  My referral for geriatric psychiatry was not able to produce a specialist for him that was within a drivable distance for him.  He would like to start another medication.  He declines admission to inpatient psych for medical stabilization of his mood.  We discussed trying mirtazapine however he declined with baseline cough.  He does have the Lexapro at home and would like to retry taking this with food that he will take his sodium supplements as prescribed.  We also discussed that the combination of Seroquel plus Lexapro can cause something called a QT prolongation which can result in a heart arrhythmia that can lead to death.  Because of this I would like him to come in and get his EKG checked.  He voiced understanding of this but also is having a hard time with cognition and concentration likely due to his current mood disorder.  Unfortunately, I do not know patient's baseline.  However, both patient and his cath.  He reports that patient's current mood is more his baseline.  Physical Exam   Vitals & Measurements    T: 97.7(Tympanic)  HR: 72(Peripheral)  BP: 120/68     WT: 176.4 lb       Review of systems is negative except as per HPI      Exam:      General: alert and oriented ×3 no acute distress.      HEENT: pupils are equal round  and reactive to light extraocular motion is intact. Normocephalic and atraumatic.       Hearing is grossly normal and there is no otorrhea.       Nares are patent there is no rhinorrhea.       Mucous membranes are moist and pink.      Chest: has bilateral rise with no increased work of breathing.      Cardiovascular: normal perfusion and brisk capillary refill.      Musculoskeletal: no gross focal abnormalities and normal gait.      Neuro: no gross focal abnormalities and memory seems slightly impaired,      Psych patient's affect is flat, he is oriented to person place and time but short-term memory is not normal.  He has to be reminded several times and things that we discussed earlier in our conversation such is why we need to recheck his sodium level and why we are using sodium supplement.  He occasionally makes eye contact and is congruent.  He is depressed and sad.  He is clean but disheveled.  No homicidal or suicidal ideation but he does voice feelings of hopelessness.      Assessment and plan adjustment disorder with pretty profound depressive symptoms.  Will restart the Lexapro but had a sodium supplement.  Patient's  also reported that he would let the patient's wife know that patient should use a salt sugar liberally with meals.  He will return to clinic in 1 week for follow-up but sooner if needed.  We also placed a referral to psychiatry today and patient's  reports that he will help remind patient and his wife to follow through with making the appointment.  Assessment/Plan       Adjustment disorder (F43.0)         Orders:          QUEtiapine, 1 tab(s) ( 25 mg ), po, qhs, # 30 tab(s), 1 Refill(s), Type: Maintenance, Pharmacy: [facility] #[id number], please discontinue prescription for seroquel 25 mg tid, sent in error! thanks, 1 tab(s) po qhs, (Ordered)          QUEtiapine, See Instructions, Instructions: 1/2 tab(s) po qhs, # 30 EA, 1 Refill(s), Type: Hard Stop, Pharmacy: [facility]  #[id number], please discontinue prescription for seroquel 25 mg tid, sent in error! thanks, (Completed)          Referral (Request), 04/17/18 15:36:00 CDT, Referred to: Psychiatry & Neurology, Adjustment disorder          Return to Clinic (Request), RFV: lab for stat BMP and then appt with me to follow, thanks, Return in 1 week                Hyponatremia (E87.1)         Orders:          sodium chloride, See Instructions, Instructions: 1/2 tablet po qday, # 30 EA, 1 Refill(s), Type: Maintenance, Pharmacy: [facility] #[id number], 1/2 tablet po qday, (Ordered)          Basic Metabolic Panel (Request), Priority: Urgent, Hyponatremia          Return to Clinic (Request), RFV: lab for stat BMP and then appt with me to follow, thanks, Return in 1 week                Orders:         Return to Clinic (Request), Return in 1 week         XR Spine Cervical Comp w/ Obliques (Request), Neck pain  Patient Information     Name:DOUG SANCHEZ      Address:      [address] [address]     Sex:Male     YOB: 1945     [phone]     Emergency Contact:KIAH SANCHEZ     MRN:[id number]     FIN:[id number]     Location:[facility]     Date of Service:04/17/2018      Primary Care Physician:       NONE ,       Attending Physician:       Asha Sommers MD, [phone]  Problem List/Past Medical History    Ongoing     Actinic keratosis     Adjustment disorder     Anticoagulated     Benign prostatic hyperplasia       Comments: Elevated PSA     Changing skin lesion       Comments: Left upper cheek.     Facial basal cell cancer     Hx of adenomatous colonic polyps     Hydrocele     Hypertension     Low back pain     Neck pain     Osteoarthritis       Comments: Bilateral knees     Ptosis of both eyelids     Sacroiliac pain    Historical     No qualifying data  Procedure/Surgical History     Colonoscopy (01/13/2016)             Comments: Two tiny rectosigmoid polyps removed. Normal.  &nbsp;Repeat in 5 years' time.     Replacement of left knee joint (01/21/2014)           Replacement of right knee joint (2010)           Hernia (1997)             Comments: Right side     Excision of papilloma (11/07/1991)             Comments: Left hypopharyngeal wall.     Hernia (1962)             Comments: Left side     Shoulder             Comments: right     Varicocele             Comments: Right testicle. &nbsp;1980s.  Medications     hydrochlorothiazide-lisinopril 12.5 mg-10 mg oral tablet: 1 tab(s), PO, Daily, 30 tab(s), 0 Refill(s).     LORazepam 0.5 mg oral tablet: 0.5 mg, 1 tab(s), po, daily, PRN: as needed for anxiety, 20 tab(s), 0 Refill(s).     SEROquel 25 mg oral tablet: 25 mg, 1 tab(s), po, qhs, 30 tab(s), 1 Refill(s).     sodium chloride 1 g oral tablet: See Instructions, 1/2 tablet po qday, 30 EA, 1 Refill(s).          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 Exercise type: Bicycling, Walking, Weight lifting., 04/05/2018     Home and Environment      Marital status: ., 04/05/2018     Nutrition and Health      Type of diet: Regular., 04/05/2018     Sexual      Sexually active: Yes. Sexual orientation: Straight or heterosexual., 04/05/2018     Substance Abuse      Never, 04/05/2018     Tobacco      Never (less than 100 in lifetime), 04/05/2018  Family History    Patient was adopted    Son: History is negative  Immunizations      Vaccine Date Status      influenza virus vaccine, inactivated 11/06/2017 Recorded      influenza virus vaccine, inactivated 10/25/2016 Recorded      influenza virus vaccine, inactivated 10/14/2015 Recorded      influenza virus vaccine, inactivated 10/03/2014 Recorded      tetanus/diphth/pertuss (Tdap) adult/adol  01/22/2010 Recorded      influenza virus vaccine, inactivated 11/05/2008 Recorded      Td 02/02/1999 Recorded  Lab Results       Lab Results (Last 4 results within 90 days)        Sodium Level: 135 [135 mmol/L - 145 mmol/L] (04/17/18 15:23:00 CDT)       Sodium Level: 127 mmol/L Low [135 mmol/L - 146 mmol/L] (04/03/18 14:34:00 CDT)       Potassium Level: 3.9 [3.5 mmol/L - 5 mmol/L] (04/17/18 15:23:00 CDT)       Potassium Level: 4.1 mmol/L [3.5 mmol/L - 5.3 mmol/L] (04/03/18 14:34:00 CDT)       Chloride Level: 98 [98 mmol/L - 110 mmol/L] (04/17/18 15:23:00 CDT)       Chloride Level: 91 mmol/L Low [98 mmol/L - 110 mmol/L] (04/03/18 14:34:00 CDT)       CO2 Level: 26 [21 mmol/L - 31 mmol/L] (04/17/18 15:23:00 CDT)       CO2 Level: 29 mmol/L [20 mmol/L - 31 mmol/L] (04/03/18 14:34:00 CDT)       AGAP: 11 [5  - 18] (04/17/18 15:23:00 CDT)       Glucose Level: 153 High [65 mg/dL - 100 mg/dL] (04/17/18 15:23:00 CDT)       Glucose Level: 90 mg/dL [65 mg/dL - 99 mg/dL] (04/03/18 14:34:00 CDT)       BUN: 21 [8 mg/dL - 25 mg/dL] (04/17/18 15:23:00 CDT)       BUN: 14 mg/dL [7 mg/dL - 25 mg/dL] (04/03/18 14:34:00 CDT)       Creatinine Level: 0.98 [0.72 mg/dL - 1.25 mg/dL] (04/17/18 15:23:00 CDT)       Creatinine Level: 0.93 mg/dL [0.7 mg/dL - 1.18 mg/dL] (04/03/18 14:34:00 CDT)       BUN/Creat Ratio: 21 High [10  - 20] (04/17/18 15:23:00 CDT)       BUN/Creat Ratio: NOT APPLICABLE [6  - 22] (04/03/18 14:34:00 CDT)       eGFR: 83 mL/min/1.73m2 [8.6 mg/dL - 10.3 mg/dL] (04/03/18 14:34:00 CDT)       eGFR : >60 [6  - 22] (04/17/18 15:23:00 CDT)       eGFR African American: 96 mL/min/1.73m2 [6  - 22] (04/03/18 14:34:00 CDT)       eGFR Non-: >60 [6  - 22] (04/17/18 15:23:00 CDT)       Calcium Level: 9.6 [8.5 mg/dL - 10.5 mg/dL] (04/17/18 15:23:00 CDT)       Calcium Level: 9.8 mg/dL [8.6 mg/dL - 10.3 mg/dL] (04/03/18 14:34:00 CDT)       T4 Free: 1.3 ng/dL [0.8 ng/dL - 1.8 ng/dL] (03/29/18 12:25:00 CDT)        TSH: 1.6 mIU/L [0.4 mIU/L - 4.5 mIU/L] (03/29/18 12:25:00 CDT)

## 2022-02-16 NOTE — PROGRESS NOTES
Chief Complaint    f/u hospital stay 7/28-8/1.  History of Present Illness      patient present to clinic today for follow up of his recent hospitalization.  He is here today with his wife.  She provides most of the history for visit due to patient's major depressive disorder.  Over the summer he developed a seizure disorder.  He has had at least 4 major seizures.  He also was found to have atrial fibrillation while he was admitted to the hospital in the hospital.  He has a follow-up scheduled with the epilepsy center.  They are unsure about who they are supposed to follow-up with for cardiology.      Since her last visit patient seems to have continued very poorly controlled major depressive disorder.  They report that he has been under the care of a psychiatrist however prior to his hospitalization patient was responsible for taking his own medications and his wife does not think that he was actually taking them.  Since his hospital discharge his wife is been making sure that he takes his mirtazapine at bedtime and his Keppra and his Eliquis.  She has not been giving him any of the venlafaxine.  He is unable to verify how much venlafaxine he has been taking.  Patient's wife says that he supposed to be taking 3 of the pills per day but he does not seem to be taking any of them.  His discharge paperwork shows that there was consideration about possibly switching him to Lamictal instead of calcitriol due to his history of major depressive disorder.      Since his hospital discharge 3 weeks ago he has had 2 more seizures.  After the seizures he remains post ictal for a full 24 hours.  Patient's wife and his son are unable to get him to move from the living room chair to bed, they can get him to drink but they cannot get him to eat anything.      While he was in the hospital he was diagnosed with a pneumonia and given a course of levofloxacin and will require a follow-up chest x-ray in another 2 to 4 weeks.       Patient is a retired clinical psychologist.  He is resistant to the idea of establishing care with a new counselor.      He has a psychiatrist however the psychiatrist has changed clinics.  They have been able to do some telephone appointments however they need their son to assist them with the telemedicine appointment so they prefer to do visits in person.      His wife would also like us to try to flush his ears out today.      I expressed my concern to patient about how flat his affect is and how he is much more disheveled today than the last time that I saw him which are indications to me that his depression is very poorly controlled.  He acknowledges that these are signs that his depression are very poorly controlled.  However, prior to his hospitalization it sounds like he was not taking his medications and since his hospitalization he is only been on the mirtazapine and this clearly is not effectively treating him.      Review of systems is negative except as per HPI including:  no fevers, chills, sore throat, runny nose, nausea, vomiting, constipation, diarrhea, rash or new skin lesions, chest pain, palpitations, slurred speech,      new paresthesia, shortness of breath or wheeze.      Exam:      General: He is disheveled.  His hair and beard are clean but he is no longer well groomed.  He seldom makes eye contact.  He does respond to questions.      HEENT: pupils are equal round and reactive to light extraocular motion is intact. Normocephalic and atraumatic.       Hearing is grossly normal and there is no otorrhea.       Nares are patent there is no rhinorrhea.       Mucous membranes are moist and pink.      Chest: has bilateral rise with no increased work of breathing.      Cardiovascular: normal perfusion and brisk capillary refill.      Musculoskeletal: no gross focal abnormalities and normal gait.      Neuro: no gross focal abnormalities       Psychiatric: speech is clear however response is slow.   Patient dressed appropriately for the weather. Mood is depressed and affect is extremely blunted judgment is poor insight is poor                     Discussed with patient to return to clinic if symptoms worsen or do not improve  Physical Exam   Vitals & Measurements    T: 98.1  F (Tympanic)  HR: 83 (Peripheral)  BP: 130/82  SpO2: 98%     WT: 180.6 lb   Assessment/Plan       A-fib (I48.91)         Ordered:          26123 office outpatient visit 40 minutes (Charge), Quantity: 1, Major depression with psychotic features  At risk for polypharmacy  A-fib  History of pneumonia  Epilepsy          Pharmacist Consult (Request), Referred to: [facility] pharmacist Dr. Park, Major depression with psychotic features  At risk for polypharmacy  A-fib  Epilepsy          Referral (Request), 08/21/20 13:44:00 CDT, Referred to: Cardiology, A-fib  Epilepsy                At risk for polypharmacy (Z91.89)         Ordered:          69320 office outpatient visit 40 minutes (Charge), Quantity: 1, Major depression with psychotic features  At risk for polypharmacy  A-fib  History of pneumonia  Epilepsy          Pharmacist Consult (Request), Referred to: [facility] pharmacist Dr. Park, Major depression with psychotic features  At risk for polypharmacy  A-fib  Epilepsy                Epilepsy (G40.909)         Ordered:          69101 office outpatient visit 40 minutes (Charge), Quantity: 1, Major depression with psychotic features  At risk for polypharmacy  A-fib  History of pneumonia  Epilepsy          Pharmacist Consult (Request), Referred to: [facility] pharmacist Dr. Park, Major depression with psychotic features  At risk for polypharmacy  A-fib  Epilepsy          Referral (Request), 08/21/20 13:44:00 CDT, Referred to: Cardiology, A-fib  Epilepsy                History of pneumonia (Z87.01)         Ordered:          75087 office outpatient visit 40 minutes (Charge), Quantity: 1, Major depression with psychotic features  At risk for  polypharmacy  A-fib  History of pneumonia  Epilepsy          Return to Clinic (Request), Return in 2 weeks chest xray                Major depression with psychotic features (F32.3)         Ordered:          24271 office outpatient visit 40 minutes (Charge), Quantity: 1, Major depression with psychotic features  At risk for polypharmacy  A-fib  History of pneumonia  Epilepsy          Pharmacist Consult (Request), Referred to: [facility] pharmacist Dr. Park, Major depression with psychotic features  At risk for polypharmacy  A-fib  Epilepsy                Orders:         amLODIPine, 1 tab(s) ( 10 mg ), PO, Daily, # 90 tab(s), 3 Refill(s), Type: Maintenance, Pharmacy: [id number], 1 tab(s) Oral daily, (Completed)         Return to Clinic (Request), RFV: follow up epilepsy/mood/a fib, Return in 4 weeks      I would like patient's wife to continue to help patient monitor his medications.  It sounds like he has stopped taking his venlafaxine so we will not resume this.  I would like patient and his wife to have patient follow-up with psychiatrist to see how his psychiatrist feels about us possibly switching from mirtazapine to Lamictal versus adding Lamictal.  Will place a referral to our [facility] pharmacist to see if she feels comfortable either helping us titrate the Keppra to an effective dose as it currently seems to be on effective as patient has had 2 seizures that have left him post ictal for 24 hours since his hospital discharge.  Also explained that patient's wife can call the epilepsy clinic and let them know that patient is had another seizure to see if his medications can be adjusted and that although the epilepsy doctor may have said the patient does not have to come to the hospital to see his has not had a seizure he certainly does need to come to the hospital if he is postictal for 24 hours and cannot protect his airway or be cared for at home.  Patient's wife says that after his first surgery  patient was brought to the emergency room here in [place] she was not allowed in and they had him come back out to the car to be discharged home when he was still too weak to walk.  They told her that he was weak but did not mention a seizure and did not give her any guidance.  After the next seizure they instead went to [place] and patient was transferred to [place].      I explained to patient that I am not a psychiatrist I do have a great team of people that can help me and if we together feel like we can monitor his mood and safely do medication changes as an outpatient we will do that for him however it would make me feel much more comfortable having the counselor helpless monitor his mood as we make these changes.  If Dr. Park, our clinical pharmacist feels that she can monitor her urine mood without the assistance of Karen walker I would also feel comfortable trying to make medication changes however if Dr. Park also feels like she would need the assistance of our therapist to help monitor her mood while making medication changes then we will only attempt to make medication changes if patient allows follow-up with counselor to help monitor mood or we will let him follow-up with his psychiatrist and neurologist.  When this was explained to patient he did seem more open to the idea of possibly establishing care with a counselor.  I explained that I would wait and see what Dr. Park's recommendations were.  I would also like them to reach out to their neurologist and to their psychiatrist and let me know what their recommendations are.  Given that Lamictal can be used as both an antidepressant especially and depression associated with bipolar disorder and can be used as an anticonvulsant and I do not think that it is been tried for him yet it might be a reasonable switch.  65 minutes spent with patient and his wife in direct face-to-face contact of which greater than 50% of time spent counseling patient and  coordinating care.  Patient Information     Name:DOUG SANCHEZ      Address:      [address] [id number]     Sex:Male     YOB: 1945     [phone]     Emergency Contact:YONG SANCHEZ     MRN:[id number]     FIN:[id number]     Location:[facility]     Date of Service:08/21/2020      Primary Care Physician:       Asha Sommers MD, [phone]      Attending Physician:       Asha Sommers MD, [phone]  Problem List/Past Medical History    Ongoing     A-fib     Actinic keratosis     Adjustment disorder     Anticoagulated     Benign prostatic hyperplasia       Comments: Elevated PSA     Changing skin lesion       Comments: Left upper cheek.     Facial basal cell cancer     Hx of adenomatous colonic polyps     Hydrocele     Hypertension     Low back pain     Major depression with psychotic features       Comments: treated with 7 ECT     Neck pain     Osteoarthritis       Comments: Bilateral knees     Ptosis of both eyelids     Sacroiliac pain    Historical     No qualifying data  Procedure/Surgical History     Colonoscopy (01/13/2016)      Comments: Two tiny rectosigmoid polyps removed.. Normal.  Repeat in 5 years' time..     Replacement of left knee joint (01/21/2014)     Replacement of right knee joint (2010)     Hernia (1997)      Comments: Right side.     Excision of papilloma (11/07/1991)      Comments: Left hypopharyngeal wall..     Hernia (1962)      Comments: Left side.     Dual Chamber Permanent Pacemaker Implant      Comments: (Pocket Location): Left Pectoral  (Model: W1DR01; Serial Number: [id number])      (Chamber): Right Ventricle (Location): Septum (Model: 5076-58 ; Serial Number: [id number])      (Chamber): Right Atrium (Location): Right Appendage (Model: 5076-52 ; Serial Number: [id number])      : [employer].     Shoulder      Comments: right.     Varicocele      Comments: Right testicle.   1980s..  Medications    apixaban 5 mg oral tablet, 5 mg, Oral, bid    levETIRAcetam 500 mg oral tablet, 500 mg= 1 tab(s), Oral, bid    mirtazapine 15 mg oral tablet, 22.5 mg= 1.5 tab(s), Oral, hs    senna 8.6 mg oral tablet, 25.8 mg= 3 tab(s), Oral, bid, PRN    venlafaxine 75 mg oral capsule, extended release, 225 mg= 3 cap(s), Oral, daily  Allergies    No Known Medication Allergies  Social History    Smoking Status     Never smoker     Alcohol      Never     Employment/School      Retired, Work/School description: Psychologist.     Exercise      Exercise frequency: 3-4 times/week. Exercise type: Bicycling, Walking, Weight lifting.     Home/Environment      Marital status: .     Nutrition/Health      Type of diet: Regular.     Sexual      Sexually active: Yes. Sexual orientation: Straight or heterosexual.     Substance Abuse      Never     Tobacco      Never (less than 100 in lifetime)  Family History    Patient was adopted    Son: History is negative  Immunizations      Vaccine Date Status          influenza virus vaccine, inactivated 11/06/2017 Recorded          influenza virus vaccine, inactivated 10/25/2016 Recorded          influenza virus vaccine, inactivated 10/14/2015 Recorded          influenza virus vaccine, inactivated 10/03/2014 Recorded          tetanus/diphth/pertuss (Tdap) adult/adol 01/22/2010 Recorded          influenza virus vaccine, inactivated 11/05/2008 Recorded          Td 02/02/1999 Recorded

## 2022-03-02 NOTE — TELEPHONE ENCOUNTER
---------------------  From: Heena Fung RN (Phone Messages Pool ([id number]_[facility]))   Sent: 1/24/2022 12:37:01 PM CST    Time of Call:  1153  Return call at:1220     Person Calling:  Roya Junior  Phone number:  [phone]    Note:   She asks for a refill of the patients antidepressant. We have only documented medications in the chart for this patient.  l let her know that the patient has not had recent prescriptions here. She tells me the patient has been seen at [facility] in the past. The patients last visit here was in 8/2020.  Since we have only documented medications on his med list. I have offered the patient an appointment. The wife declines to schedule. She will contact the most recent prescriber.    Last office visit and reason:  F/U hospitalization 8/2020